# Patient Record
Sex: FEMALE
[De-identification: names, ages, dates, MRNs, and addresses within clinical notes are randomized per-mention and may not be internally consistent; named-entity substitution may affect disease eponyms.]

---

## 2020-03-04 ENCOUNTER — HOSPITAL ENCOUNTER (EMERGENCY)
Dept: HOSPITAL 5 - ED | Age: 40
LOS: 5 days | Discharge: PSYCHIATRIC OR SUBSTANCE USE FACILITY | End: 2020-03-09
Payer: SELF-PAY

## 2020-03-04 DIAGNOSIS — F10.20: ICD-10-CM

## 2020-03-04 DIAGNOSIS — F17.200: ICD-10-CM

## 2020-03-04 DIAGNOSIS — F31.9: Primary | ICD-10-CM

## 2020-03-04 DIAGNOSIS — E87.6: ICD-10-CM

## 2020-03-04 DIAGNOSIS — Z88.2: ICD-10-CM

## 2020-03-04 DIAGNOSIS — Z79.899: ICD-10-CM

## 2020-03-04 LAB
BASOPHILS # (AUTO): 0.1 K/MM3 (ref 0–0.1)
BASOPHILS NFR BLD AUTO: 0.8 % (ref 0–1.8)
BENZODIAZEPINES SCREEN,URINE: (no result)
BILIRUB UR QL STRIP: (no result)
BLOOD UR QL VISUAL: (no result)
BUN SERPL-MCNC: 10 MG/DL (ref 7–17)
BUN/CREAT SERPL: 13 %
CALCIUM SERPL-MCNC: 9.1 MG/DL (ref 8.4–10.2)
EOSINOPHIL # BLD AUTO: 0.1 K/MM3 (ref 0–0.4)
EOSINOPHIL NFR BLD AUTO: 0.9 % (ref 0–4.3)
HCT VFR BLD CALC: 41.6 % (ref 30.3–42.9)
HEMOLYSIS INDEX: 2
HGB BLD-MCNC: 14.7 GM/DL (ref 10.1–14.3)
LYMPHOCYTES # BLD AUTO: 3.6 K/MM3 (ref 1.2–5.4)
LYMPHOCYTES NFR BLD AUTO: 34 % (ref 13.4–35)
MCHC RBC AUTO-ENTMCNC: 35 % (ref 30–34)
MCV RBC AUTO: 94 FL (ref 79–97)
METHADONE SCREEN,URINE: (no result)
MONOCYTES # (AUTO): 0.8 K/MM3 (ref 0–0.8)
MONOCYTES % (AUTO): 7.5 % (ref 0–7.3)
MUCOUS THREADS #/AREA URNS HPF: (no result) /HPF
OPIATE SCREEN,URINE: (no result)
PH UR STRIP: 5 [PH] (ref 5–7)
PLATELET # BLD: 220 K/MM3 (ref 140–440)
RBC # BLD AUTO: 4.44 M/MM3 (ref 3.65–5.03)
RBC #/AREA URNS HPF: 75 /HPF (ref 0–6)
UROBILINOGEN UR-MCNC: 2 MG/DL (ref ?–2)
WBC #/AREA URNS HPF: 5 /HPF (ref 0–6)

## 2020-03-04 PROCEDURE — 84132 ASSAY OF SERUM POTASSIUM: CPT

## 2020-03-04 PROCEDURE — 80048 BASIC METABOLIC PNL TOTAL CA: CPT

## 2020-03-04 PROCEDURE — 80320 DRUG SCREEN QUANTALCOHOLS: CPT

## 2020-03-04 PROCEDURE — 36415 COLL VENOUS BLD VENIPUNCTURE: CPT

## 2020-03-04 PROCEDURE — 81025 URINE PREGNANCY TEST: CPT

## 2020-03-04 PROCEDURE — 80307 DRUG TEST PRSMV CHEM ANLYZR: CPT

## 2020-03-04 PROCEDURE — 81001 URINALYSIS AUTO W/SCOPE: CPT

## 2020-03-04 PROCEDURE — G0480 DRUG TEST DEF 1-7 CLASSES: HCPCS

## 2020-03-04 PROCEDURE — 96365 THER/PROPH/DIAG IV INF INIT: CPT

## 2020-03-04 PROCEDURE — 96366 THER/PROPH/DIAG IV INF ADDON: CPT

## 2020-03-04 PROCEDURE — 99285 EMERGENCY DEPT VISIT HI MDM: CPT

## 2020-03-04 PROCEDURE — 85025 COMPLETE CBC W/AUTO DIFF WBC: CPT
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
## 2020-03-06 RX ADMIN — CITALOPRAM HYDROBROMIDE SCH MG: 20 TABLET ORAL at 14:08
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
## 2020-03-07 RX ADMIN — CITALOPRAM HYDROBROMIDE SCH MG: 20 TABLET ORAL at 10:26

## 2020-03-07 RX ADMIN — FOLIC ACID SCH MG: 1 TABLET ORAL at 10:27

## 2020-03-07 NOTE — PROGRESS NOTE
Subjective





- Reason for Consult


Consult date: 03/07/20


Reason for consult: suicidal ideation, hallucinations





- Chief Complaint


Chief complaint: 


The patient's medical record was reviewed and the patient's progress discussed 

with the nursing staff. 





During my interview with the patient today, she is sitting on side of the cot. 

She is a/o x 3. She makes good eye contact. Her affect is restricted. The 

patient states, "I'm not doing good. I keep hearing voices." She says the voices

are "telling me to kill myself." The patient verbalizes being "depressed" and 

states, "I want to cut my wrist." She says she slept "good" and her appetite is 

"so-so."





ROS:


Constitutional: Negative for weight loss


ENT: Negative for stridor


Respiratory: Negative for cough or hemoptysis


All other systems reviewed and are negative





MENTAL STATUS


General Appearance: Dressed appropriately


Behavior: calm and cooperative, good eye contact


Mood: "depressed" 


Affect: Restricted


Thought Process: Goal directed


Speech: Normal tone and pace


Suicidal Ideation: Yes, with plan to cut her wrist


Homicidal Ideation: Denies 


Hallucinations: Auditory


Delusions: None elicited


Insight and Judgment: Limited


Memory/Cognition: Fair





Diagnoses: Major Depressive Disorder, Severe, w/o Psychotic Features





Plan


Increase Seroquel 50mg po daily to improve depressive symptoms and decrease 

hallucinations


Medical: Per primary


Sitter: Defer to primary


Disposition: The patient meets the requirement for acute hospitalization at this

time. She may transfer to an acute psychiatric facility once medically cleared. 


The patient verbalizes understanding and agreement of treatment plan and 

medications. 


Will continue to follow until the patient is transferred or her condition 

improves. 


Please call with any questions or concerns. Thank you for this consult. 








Mental Status Exam





- Vital signs


                                Last Vital Signs











Temp  97.9 F   03/07/20 07:52


 


Pulse  90   03/07/20 07:52


 


Resp  18   03/07/20 08:23


 


BP  128/83   03/07/20 07:52


 


Pulse Ox  96   03/07/20 08:00

## 2020-03-08 RX ADMIN — FOLIC ACID SCH MG: 1 TABLET ORAL at 09:47

## 2020-03-08 RX ADMIN — CITALOPRAM HYDROBROMIDE SCH MG: 20 TABLET ORAL at 09:46

## 2020-03-08 NOTE — PROGRESS NOTE
Subjective





- Reason for Consult


Consult date: 03/08/20


Reason for consult: suicidal ideation, depression





- Chief Complaint


Chief complaint: 


The patient's medical record was reviewed and the patient's progress discussed 

with the nursing staff. The nurse caring for the patient states the patient has 

expressed depression and current suicidal thoughts with her. 





During my interview with the patient today, she is sitting on side of the cot. 

She is a/o x 3. She makes good eye contact. Her affect is restricted. The 

patient states, "I'm doing about the same." She says she is "depressed." She 

says "I'm still suicidal. I have so many bad things going on in my life." She 

denies having a plan today. She verbalizes "hearing voices telling me to kill 

myself." She says she slept "alright." The patient says her appetite is "okay."





ROS:


Constitutional: Negative for weight loss


ENT: Negative for stridor


Respiratory: Negative for cough or hemoptysis


All other systems reviewed and are negative





MENTAL STATUS


General Appearance: Dressed appropriately


Behavior: calm and cooperative, good eye contact


Mood: "depressed" 


Affect: Restricted


Thought Process: Goal directed


Speech: Normal tone and pace


Suicidal Ideation: Yes


Homicidal Ideation: Denies 


Hallucinations: Auditory


Delusions: None elicited


Insight and Judgment: Limited


Memory/Cognition: Fair





Diagnoses: Major Depressive Disorder, Severe, w/o Psychotic Features





Plan


Increase Seroquel 100mg po BID to improve depressive symptoms and decrease 

hallucinations


Medical: Per primary


Sitter: Defer to primary


Disposition: The patient meets the requirement for acute hospitalization at this

time. She may transfer to an acute psychiatric facility once medically cleared. 


The patient verbalizes understanding and agreement of treatment plan and 

medications. 


Will continue to follow until the patient is transferred or her condition 

improves. 


Please call with any questions or concerns. Thank you for this consult. 








Mental Status Exam





- Vital signs


                                Last Vital Signs











Temp  97.6 F   03/08/20 07:34


 


Pulse  82   03/08/20 07:34


 


Resp  18   03/08/20 07:34


 


BP  128/93   03/08/20 07:34


 


Pulse Ox  96   03/08/20 07:34

## 2020-03-09 VITALS — DIASTOLIC BLOOD PRESSURE: 83 MMHG | SYSTOLIC BLOOD PRESSURE: 133 MMHG

## 2020-03-09 RX ADMIN — CITALOPRAM HYDROBROMIDE SCH MG: 20 TABLET ORAL at 09:31

## 2020-03-09 RX ADMIN — FOLIC ACID SCH MG: 1 TABLET ORAL at 09:31
